# Patient Record
Sex: FEMALE | Race: WHITE | Employment: FULL TIME | URBAN - METROPOLITAN AREA
[De-identification: names, ages, dates, MRNs, and addresses within clinical notes are randomized per-mention and may not be internally consistent; named-entity substitution may affect disease eponyms.]

---

## 2021-08-18 ENCOUNTER — HOSPITAL ENCOUNTER (OUTPATIENT)
Age: 66
Setting detail: OUTPATIENT SURGERY
Discharge: HOME | End: 2021-08-18
Attending: OPHTHALMOLOGY | Admitting: OPHTHALMOLOGY
Payer: MEDICARE

## 2021-08-18 ENCOUNTER — ANESTHESIA (OUTPATIENT)
Dept: SURGERY | Age: 66
End: 2021-08-18
Payer: MEDICARE

## 2021-08-18 ENCOUNTER — ANESTHESIA EVENT (OUTPATIENT)
Dept: SURGERY | Age: 66
End: 2021-08-18
Payer: MEDICARE

## 2021-08-18 VITALS
HEIGHT: 70 IN | DIASTOLIC BLOOD PRESSURE: 65 MMHG | WEIGHT: 151.2 LBS | RESPIRATION RATE: 16 BRPM | HEART RATE: 51 BPM | BODY MASS INDEX: 21.64 KG/M2 | TEMPERATURE: 97.5 F | OXYGEN SATURATION: 100 % | SYSTOLIC BLOOD PRESSURE: 131 MMHG

## 2021-08-18 LAB — POTASSIUM BLD-SCNC: 3.3 MMOL/L (ref 3.5–5.1)

## 2021-08-18 PROCEDURE — 77030033576 HC PK VITRCMY TOT PLS ALCN -F: Performed by: OPHTHALMOLOGY

## 2021-08-18 PROCEDURE — 77030013428 HC CAUT LOW TEMP AARM -A: Performed by: OPHTHALMOLOGY

## 2021-08-18 PROCEDURE — 77030016693: Performed by: OPHTHALMOLOGY

## 2021-08-18 PROCEDURE — 77030018837 HC SOL IRR OPTH ALCN -A: Performed by: OPHTHALMOLOGY

## 2021-08-18 PROCEDURE — 76210000020 HC REC RM PH II FIRST 0.5 HR: Performed by: OPHTHALMOLOGY

## 2021-08-18 PROCEDURE — 74011000250 HC RX REV CODE- 250: Performed by: NURSE ANESTHETIST, CERTIFIED REGISTERED

## 2021-08-18 PROCEDURE — 76010000160 HC OR TIME 0.5 TO 1 HR INTENSV-TIER 1: Performed by: OPHTHALMOLOGY

## 2021-08-18 PROCEDURE — 74011000250 HC RX REV CODE- 250: Performed by: OPHTHALMOLOGY

## 2021-08-18 PROCEDURE — 2709999900 HC NON-CHARGEABLE SUPPLY: Performed by: OPHTHALMOLOGY

## 2021-08-18 PROCEDURE — 74011250637 HC RX REV CODE- 250/637: Performed by: ANESTHESIOLOGY

## 2021-08-18 PROCEDURE — 77030032655 HC PRB LSR FLX ILLUM ALCON -C: Performed by: OPHTHALMOLOGY

## 2021-08-18 PROCEDURE — 77030018838 HC SOL IRR OPTH ALCN -B: Performed by: OPHTHALMOLOGY

## 2021-08-18 PROCEDURE — 77030032653 HC PRB DSP DIATHRMY DISP ALCN -B: Performed by: OPHTHALMOLOGY

## 2021-08-18 PROCEDURE — 77030026085: Performed by: OPHTHALMOLOGY

## 2021-08-18 PROCEDURE — 74011250636 HC RX REV CODE- 250/636: Performed by: ANESTHESIOLOGY

## 2021-08-18 PROCEDURE — 76210000006 HC OR PH I REC 0.5 TO 1 HR: Performed by: OPHTHALMOLOGY

## 2021-08-18 PROCEDURE — 74011250636 HC RX REV CODE- 250/636: Performed by: NURSE ANESTHETIST, CERTIFIED REGISTERED

## 2021-08-18 PROCEDURE — 74011250636 HC RX REV CODE- 250/636: Performed by: OPHTHALMOLOGY

## 2021-08-18 PROCEDURE — 77030032652 HC NDL OPHTH BKLFSH ALCN -C: Performed by: OPHTHALMOLOGY

## 2021-08-18 PROCEDURE — 77030017621 HC NDL OPHTH1 ALCN -B: Performed by: OPHTHALMOLOGY

## 2021-08-18 PROCEDURE — 77030032623 HC KT VITRCMY TOT PLS ALCN -F: Performed by: OPHTHALMOLOGY

## 2021-08-18 PROCEDURE — 84132 ASSAY OF SERUM POTASSIUM: CPT

## 2021-08-18 PROCEDURE — 76060000032 HC ANESTHESIA 0.5 TO 1 HR: Performed by: OPHTHALMOLOGY

## 2021-08-18 PROCEDURE — 77030038275 HC DEV VIEW LENS DISP OCLS -B: Performed by: OPHTHALMOLOGY

## 2021-08-18 RX ORDER — MIDAZOLAM HYDROCHLORIDE 1 MG/ML
2 INJECTION, SOLUTION INTRAMUSCULAR; INTRAVENOUS ONCE
Status: DISCONTINUED | OUTPATIENT
Start: 2021-08-18 | End: 2021-08-18 | Stop reason: HOSPADM

## 2021-08-18 RX ORDER — LISINOPRIL AND HYDROCHLOROTHIAZIDE 20; 25 MG/1; MG/1
1 TABLET ORAL DAILY
COMMUNITY
Start: 2021-01-04

## 2021-08-18 RX ORDER — DEXAMETHASONE SODIUM PHOSPHATE 100 MG/10ML
INJECTION INTRAMUSCULAR; INTRAVENOUS AS NEEDED
Status: DISCONTINUED | OUTPATIENT
Start: 2021-08-18 | End: 2021-08-18 | Stop reason: HOSPADM

## 2021-08-18 RX ORDER — PHENYLEPHRINE HYDROCHLORIDE 25 MG/ML
1 SOLUTION/ DROPS OPHTHALMIC
Status: COMPLETED | OUTPATIENT
Start: 2021-08-18 | End: 2021-08-18

## 2021-08-18 RX ORDER — ACETAMINOPHEN 500 MG
1000 TABLET ORAL ONCE
Status: COMPLETED | OUTPATIENT
Start: 2021-08-18 | End: 2021-08-18

## 2021-08-18 RX ORDER — CEFAZOLIN SODIUM 1 G/3ML
INJECTION, POWDER, FOR SOLUTION INTRAMUSCULAR; INTRAVENOUS AS NEEDED
Status: DISCONTINUED | OUTPATIENT
Start: 2021-08-18 | End: 2021-08-18 | Stop reason: HOSPADM

## 2021-08-18 RX ORDER — MIDAZOLAM HYDROCHLORIDE 1 MG/ML
2 INJECTION, SOLUTION INTRAMUSCULAR; INTRAVENOUS
Status: DISCONTINUED | OUTPATIENT
Start: 2021-08-18 | End: 2021-08-18 | Stop reason: HOSPADM

## 2021-08-18 RX ORDER — ATROPINE SULFATE 10 MG/ML
1 SOLUTION/ DROPS OPHTHALMIC
Status: COMPLETED | OUTPATIENT
Start: 2021-08-18 | End: 2021-08-18

## 2021-08-18 RX ORDER — ISOTRETINOIN 40 MG/1
40 CAPSULE, GELATIN COATED ORAL 2 TIMES DAILY
COMMUNITY
Start: 2021-07-20

## 2021-08-18 RX ORDER — ALBUTEROL SULFATE 0.83 MG/ML
2.5 SOLUTION RESPIRATORY (INHALATION) AS NEEDED
Status: DISCONTINUED | OUTPATIENT
Start: 2021-08-18 | End: 2021-08-18 | Stop reason: HOSPADM

## 2021-08-18 RX ORDER — OXYCODONE HYDROCHLORIDE 5 MG/1
5 TABLET ORAL
Status: DISCONTINUED | OUTPATIENT
Start: 2021-08-18 | End: 2021-08-18 | Stop reason: HOSPADM

## 2021-08-18 RX ORDER — HYDROMORPHONE HYDROCHLORIDE 2 MG/ML
0.5 INJECTION, SOLUTION INTRAMUSCULAR; INTRAVENOUS; SUBCUTANEOUS
Status: DISCONTINUED | OUTPATIENT
Start: 2021-08-18 | End: 2021-08-18 | Stop reason: HOSPADM

## 2021-08-18 RX ORDER — LIDOCAINE HYDROCHLORIDE 20 MG/ML
INJECTION, SOLUTION EPIDURAL; INFILTRATION; INTRACAUDAL; PERINEURAL AS NEEDED
Status: DISCONTINUED | OUTPATIENT
Start: 2021-08-18 | End: 2021-08-18 | Stop reason: HOSPADM

## 2021-08-18 RX ORDER — SODIUM CHLORIDE, SODIUM LACTATE, POTASSIUM CHLORIDE, CALCIUM CHLORIDE 600; 310; 30; 20 MG/100ML; MG/100ML; MG/100ML; MG/100ML
50 INJECTION, SOLUTION INTRAVENOUS CONTINUOUS
Status: DISCONTINUED | OUTPATIENT
Start: 2021-08-18 | End: 2021-08-18 | Stop reason: HOSPADM

## 2021-08-18 RX ORDER — TROPICAMIDE 10 MG/ML
1 SOLUTION/ DROPS OPHTHALMIC
Status: COMPLETED | OUTPATIENT
Start: 2021-08-18 | End: 2021-08-18

## 2021-08-18 RX ORDER — METOPROLOL SUCCINATE 25 MG/1
TABLET, EXTENDED RELEASE ORAL
COMMUNITY
Start: 2021-01-04

## 2021-08-18 RX ORDER — SODIUM CHLORIDE, SODIUM LACTATE, POTASSIUM CHLORIDE, CALCIUM CHLORIDE 600; 310; 30; 20 MG/100ML; MG/100ML; MG/100ML; MG/100ML
75 INJECTION, SOLUTION INTRAVENOUS CONTINUOUS
Status: DISCONTINUED | OUTPATIENT
Start: 2021-08-18 | End: 2021-08-18 | Stop reason: HOSPADM

## 2021-08-18 RX ORDER — PROPOFOL 10 MG/ML
INJECTION, EMULSION INTRAVENOUS AS NEEDED
Status: DISCONTINUED | OUTPATIENT
Start: 2021-08-18 | End: 2021-08-18 | Stop reason: HOSPADM

## 2021-08-18 RX ORDER — LIDOCAINE HYDROCHLORIDE 10 MG/ML
0.1 INJECTION INFILTRATION; PERINEURAL AS NEEDED
Status: DISCONTINUED | OUTPATIENT
Start: 2021-08-18 | End: 2021-08-18 | Stop reason: HOSPADM

## 2021-08-18 RX ORDER — SERTRALINE HYDROCHLORIDE 100 MG/1
200 TABLET, FILM COATED ORAL DAILY
COMMUNITY
Start: 2021-01-04

## 2021-08-18 RX ORDER — ATORVASTATIN CALCIUM 20 MG/1
20 TABLET, FILM COATED ORAL DAILY
COMMUNITY
Start: 2021-01-04

## 2021-08-18 RX ORDER — FENTANYL CITRATE 50 UG/ML
100 INJECTION, SOLUTION INTRAMUSCULAR; INTRAVENOUS ONCE
Status: DISCONTINUED | OUTPATIENT
Start: 2021-08-18 | End: 2021-08-18 | Stop reason: HOSPADM

## 2021-08-18 RX ORDER — BUPIVACAINE HYDROCHLORIDE 7.5 MG/ML
INJECTION, SOLUTION EPIDURAL; RETROBULBAR AS NEEDED
Status: DISCONTINUED | OUTPATIENT
Start: 2021-08-18 | End: 2021-08-18 | Stop reason: HOSPADM

## 2021-08-18 RX ADMIN — PHENYLEPHRINE HYDROCHLORIDE 1 DROP: 25 SOLUTION/ DROPS OPHTHALMIC at 17:12

## 2021-08-18 RX ADMIN — TROPICAMIDE 1 DROP: 10 SOLUTION/ DROPS OPHTHALMIC at 17:12

## 2021-08-18 RX ADMIN — TROPICAMIDE 1 DROP: 10 SOLUTION/ DROPS OPHTHALMIC at 17:30

## 2021-08-18 RX ADMIN — SODIUM CHLORIDE, SODIUM LACTATE, POTASSIUM CHLORIDE, AND CALCIUM CHLORIDE 75 ML/HR: 600; 310; 30; 20 INJECTION, SOLUTION INTRAVENOUS at 17:26

## 2021-08-18 RX ADMIN — ATROPINE SULFATE 1 DROP: 10 SOLUTION/ DROPS OPHTHALMIC at 17:23

## 2021-08-18 RX ADMIN — ATROPINE SULFATE 1 DROP: 10 SOLUTION/ DROPS OPHTHALMIC at 17:12

## 2021-08-18 RX ADMIN — LIDOCAINE HYDROCHLORIDE 60 MG: 20 INJECTION, SOLUTION EPIDURAL; INFILTRATION; INTRACAUDAL; PERINEURAL at 18:10

## 2021-08-18 RX ADMIN — PHENYLEPHRINE HYDROCHLORIDE 1 DROP: 25 SOLUTION/ DROPS OPHTHALMIC at 17:23

## 2021-08-18 RX ADMIN — TROPICAMIDE 1 DROP: 10 SOLUTION/ DROPS OPHTHALMIC at 17:23

## 2021-08-18 RX ADMIN — PROPOFOL 50 MG: 10 INJECTION, EMULSION INTRAVENOUS at 18:11

## 2021-08-18 RX ADMIN — PHENYLEPHRINE HYDROCHLORIDE 1 DROP: 25 SOLUTION/ DROPS OPHTHALMIC at 17:30

## 2021-08-18 RX ADMIN — ACETAMINOPHEN 1000 MG: 500 TABLET, FILM COATED ORAL at 17:12

## 2021-08-18 NOTE — ANESTHESIA PREPROCEDURE EVALUATION
Relevant Problems   No relevant active problems       Anesthetic History   No history of anesthetic complications            Review of Systems / Medical History  Patient summary reviewed, nursing notes reviewed and pertinent labs reviewed    Pulmonary  Within defined limits                 Neuro/Psych   Within defined limits           Cardiovascular    Hypertension          Hyperlipidemia    Exercise tolerance: >4 METS     GI/Hepatic/Renal  Within defined limits              Endo/Other  Within defined limits           Other Findings              Physical Exam    Airway  Mallampati: II      Mouth opening: Normal     Cardiovascular  Regular rate and rhythm,  S1 and S2 normal,  no murmur, click, rub, or gallop             Dental  No notable dental hx       Pulmonary  Breath sounds clear to auscultation               Abdominal         Other Findings            Anesthetic Plan    ASA: 2  Anesthesia type: total IV anesthesia          Induction: Intravenous  Anesthetic plan and risks discussed with: Patient

## 2021-08-18 NOTE — BRIEF OP NOTE
Brief Postoperative Note    Patient: Fabrizio Guillaume  YOB: 1955  MRN: [id number]    Date of Procedure: 8/18/2021     Pre-Op Diagnosis: Left retinal detachment [H33.22]    Post-Op Diagnosis: Same as preoperative diagnosis.       Procedure(s):  LEFT VITRECTOMY, POSTERIOR 25 GAUGE LASER GAS FLUID EXCHANGE    Surgeon(s):  Monserrat Kinney MD    Surgical Assistant: None    Anesthesia: MAC     Estimated Blood Loss (mL): Minimal    Complications: None    Specimens: * No specimens in log *     Implants: * No implants in log *    Drains: * No LDAs found *    Findings: Nasal     Electronically Signed by Cristine Madera MD on 8/18/2021 at 7:03 PM

## 2021-08-18 NOTE — ANESTHESIA POSTPROCEDURE EVALUATION
Procedure(s):  LEFT VITRECTOMY, POSTERIOR 25 GAUGE LASER GAS FLUID EXCHANGE.    total IV anesthesia    Anesthesia Post Evaluation      Multimodal analgesia: multimodal analgesia used between 6 hours prior to anesthesia start to PACU discharge  Patient location during evaluation: PACU  Patient participation: complete - patient participated  Level of consciousness: awake and alert  Pain management: adequate  Airway patency: patent  Anesthetic complications: no  Cardiovascular status: acceptable  Respiratory status: acceptable, spontaneous ventilation and nonlabored ventilation  Hydration status: acceptable  Post anesthesia nausea and vomiting:  none      INITIAL Post-op Vital signs:   Vitals Value Taken Time   /65 08/18/21 1929   Temp 36.4 °C (97.5 °F) 08/18/21 1904   Pulse 50 08/18/21 1930   Resp 15 08/18/21 1924   SpO2 100 % 08/18/21 1930   Vitals shown include unvalidated device data.

## 2021-08-18 NOTE — DISCHARGE INSTRUCTIONS
[facility], [facility] MD Kashif Gonzales MD Tanya Ra. Reyes Silva, MD Unice Claw. Kem Richards MD    [phone] or [phone] or [phone] or [phone]    Post Operative Instructions for Retina and Vitreous Surgery Patients    The following are a few guidelines that you should observe for two weeks after surgery:    1. Avoid stooping over, lifting heavy weights or bumping your head. 2.  Special positioning:    3. No extensive traveling except what is necessary. If a gas air bubble was put in your      eye at surgery - avoid air travel until you consult your doctor. 4.  You may watch television, read, cook and wash dishes as long as it does not interfere        with special positioning instructions. 5.  No strenuous activity or sexual intercourse. 6.  Some discharge from the operated eye is to be expected. This should gradually        improve. 7.  Wear the eye shield or glasses at all times until cleared by the doctor. 8.  If you experience increasing pain, decreasing vision, or pus like discharge, call the      Office. 9. BRING ALL EYE DROPS TO YOU POSTOPERATIVE APPOINTMENT! 10. Return appointment at the [facility]        On _______________    Medication Interaction:  During your procedure you potentially received a medication or medications which may reduce the effectiveness of oral contraceptives. Please consider other forms of contraception for 1 month following your procedure if you are currently using oral contraceptives as your primary form of birth control. In addition to this, we recommend continuing your oral contraceptive as prescribed, unless otherwise instructed by your physician, during this time.     After general anesthesia or intravenous sedation, for 24 hours or while taking prescription Narcotics:  · Limit your activities  · A responsible adult needs to be with you for the next 24 hours  · Do not drive and operate hazardous machinery  · Do not make important personal or business decisions  · Do not drink alcoholic beverages  · If you have not urinated within 8 hours after discharge, and you are experiencing discomfort from urinary retention, please go to the nearest ED. · If you have sleep apnea and have a CPAP machine, please use it for all naps and sleeping. · Please use caution when taking narcotics and any of your home medications that may cause drowsiness. *  Please give a list of your current medications to your Primary Care Provider. *  Please update this list whenever your medications are discontinued, doses are      changed, or new medications (including over-the-counter products) are added. *  Please carry medication information at all times in case of emergency situations. These are general instructions for a healthy lifestyle:  No smoking/ No tobacco products/ Avoid exposure to second hand smoke  Surgeon General's Warning:  Quitting smoking now greatly reduces serious risk to your health. Obesity, smoking, and sedentary lifestyle greatly increases your risk for illness  A healthy diet, regular physical exercise & weight monitoring are important for maintaining a healthy lifestyle    You may be retaining fluid if you have a history of heart failure or if you experience any of the following symptoms:  Weight gain of 3 pounds or more overnight or 5 pounds in a week, increased swelling in our hands or feet or shortness of breath while lying flat in bed. Please call your doctor as soon as you notice any of these symptoms; do not wait until your next office visit.

## 2021-08-19 NOTE — OP NOTES
[facility]  OPERATIVE REPORT    Name:  Percy Kay  MR#:  [id number]  :  1955  ACCOUNT #:  [de-identified]  DATE OF SERVICE:  2021    PREOPERATIVE DIAGNOSES:  Rhegmatogenous retinal detachment, left eye, macula uninvolved, detachment extending from 7 o'clock to 2 o'clock, retinal tears nasally at 8 to 9, lattice at 6 o'clock with small fishmouth break 6 as well. POSTOPERATIVE DIAGNOSES:  Same. PROCEDURES PERFORMED:  Repair of retinal detachment, pars plana vitrectomy, fluid-gas exchange SF6 20% and laser photocoagulation 658 spots, left eye. SURGEON:  Mick Velazquez. Gilberto Bliss MD    ASSISTANT:  None. ANESTHESIA:  IV general with retrobulbar and Whit Comber blocks. COMPLICATIONS:  None. SPECIMENS REMOVED:  None. IMPLANTS:  None. ESTIMATED BLOOD LOSS:  Less than 1 mL. FINDINGS:  As above. PROCEDURE:  The patient was identified and after obtaining informed consent, the patient's left pupil was dilated. The patient, surgeon, and operating room staff verified the operative site. The patient was taken to the operative suite where after preoperative time-out was performed, retrobulbar and Whit Comber blocks were placed about the left eye under IV sedation. The patient was prepped and draped in the usual sterile fashion for ophthalmic surgery in the left eye using a Betadine prep of the conjunctival cul-de-sac and periocular skin and plastic draping was placed over the lids and lashes. Eyelid speculum was placed and 25-gauge valved trocars were then used to make sclerotomy at the 10, 2 and 4 o'clock 3 mm posterior to the limbus. The infusion cannula was run through to eliminate any gas bubbles. This was then placed in the four-quadrant sclerotomy trocar and was then identified within the vitreous cavity. After noting that it was free of any obstruction, the infusion line was opened. The BIOM was brought over the patient in fine focus.   The Ocutome handpiece and Light Pipe were placed in the eye. The findings were noted as above. Core vitrectomy was performed, clearing the central vitreous cavity. The vitreous was trimmed down anterior to the equator for 360 degrees giving special attention to the areas of vitreous from underlying repairs at 8 and 9 and also the area of lattice at 6 o'clock. It was noted that this area also had a small fishmouth tear at 6 o'clock as well. After trimming anterior vitreous for 360 degrees, the internal diathermy was placed in the eye. The retinal tears were marked. The backflush needle was placed into the eye. Fluid-air exchange was performed flattening the retina. Subretinal fluid was drained through the 8 o'clock tear. Retina was reattached to the retinal pigment epithelium and the curved flexible laser probe was placed in the eye and laser photocoagulation was placed around the retinal tears at 8 and 9 o'clock as well as the lattice degeneration at 6 and anterior to the equator in the areas of the bed of the retinal detachment. The SF6 gas was then prepared through sterile Millipore filters to a concentration of 20%. This was attached to the infusion line via a three-way stopcock. A backflush needle was then used to dry the retinal surface again and the superior trocars were then removed from the eye and SF6 gas mixture was then irrigated through the eye venting the eye for 30-gauge needle attached to tuberculin syringe placed in the eye through the pars plana at 10 o'clock. Approximately, 35 mL of the SF6 gas mixture was irrigated through the eye and intraocular pressure was then normalized. The infusion line was clamped and removed from the eye with the trocar at 4 o'clock being removed from the eye and all the wounds being airtight. The eye maintained normal intraocular pressure for greater than one minute with pressure by palpation of approximately 25 mmHg.   100 mg Ancef and 5 mg of Decadron were then given subconjunctivally and the eyelid speculum was removed from the eye. The patient was undraped. Two eye pads and eye shield were placed over the eye. The patient was then transported to the recovery area in stable condition.       Neeru Guerrero MD      [id number]  D:  08/18/2021 20:03  T:  08/19/2021 4:54  JOB #:  [id number]

## 2023-05-14 RX ORDER — METOPROLOL SUCCINATE 25 MG/1
TABLET, EXTENDED RELEASE ORAL
COMMUNITY
Start: 2021-01-04

## 2023-05-14 RX ORDER — LISINOPRIL AND HYDROCHLOROTHIAZIDE 25; 20 MG/1; MG/1
1 TABLET ORAL DAILY
COMMUNITY
Start: 2021-01-04

## 2023-05-14 RX ORDER — SERTRALINE HYDROCHLORIDE 100 MG/1
200 TABLET, FILM COATED ORAL DAILY
COMMUNITY
Start: 2021-01-04

## 2023-05-14 RX ORDER — ATORVASTATIN CALCIUM 20 MG/1
20 TABLET, FILM COATED ORAL DAILY
COMMUNITY
Start: 2021-01-04

## 2023-05-14 RX ORDER — ISOTRETINOIN 40 MG/1
40 CAPSULE ORAL 2 TIMES DAILY
COMMUNITY
Start: 2021-07-20

## (undated) DEVICE — LENS VITRCTMY OCU FLAT DISP

## (undated) DEVICE — CONSTELLATION VISION SYSTEM 5000 CPM ULTRAVIT PROBE STRAIGHT ENDOILLUMINATOR 25+ TOTALPLUS VITRECTOMY PAK EDGEPLUS BLADE VALVED ENTRY SYSTEM: Brand: CONSTELLATION, ULTRAVIT, 25+, TOTALPLUS, EDGEPLUS

## (undated) DEVICE — 25 GA ILLUMINATED FLEXIBLE CURVED LASER PROBE: Brand: ALCON, ENGAUGE

## (undated) DEVICE — SOLUTION IRRIGATION BAL SALT SOLUTION 500 ML BTL 6/CA BSS +

## (undated) DEVICE — CONSTELLATION VISION SYSTEM 7500 CPM ULTRAVIT PROBE STRAIGHT ENDOILLUMINATOR 25+ TOTALPLUS VITRECTOMY PAK EDGEPLUS BLADE VALVED ENTRY SYSTEM: Brand: CONSTELLATION, ULTRAVIT, 25+, TOTALPLUS, EDGEPLUS

## (undated) DEVICE — ISPAN SF6 CONSTELLATION 125GM: Brand: ISPAN

## (undated) DEVICE — MICROSURGICAL INSTRUMENT 25GA SOFT TIP NEEDLE: Brand: ALCON

## (undated) DEVICE — STANDARD HYPODERMIC NEEDLE,ALUMINUM HUB: Brand: MONOJECT

## (undated) DEVICE — DISPOSABLE BIPOLAR CODE, 12' (3.66 M): Brand: CONMED

## (undated) DEVICE — DRAPE TOWEL: Brand: CONVERTORS

## (undated) DEVICE — 25 GAUGE ENTRY SYSTEM, ENHANCED, 1 EA: Brand: ALCON

## (undated) DEVICE — CAUTERY ES 1100DEG LO TEMP ELONG FN TIP

## (undated) DEVICE — SOLUTION IRRIGATION BAL SALT SOLUTION 500 ML STRL BSS

## (undated) DEVICE — SURGICAL PROCEDURE PACK EYE CDS

## (undated) DEVICE — DIATHERMY PROBE DSP, 25G: Brand: ALCON GRIESHABER

## (undated) DEVICE — PAD,EYE,1-5/8X2 5/8,STERILE,LF,1/PK: Brand: MEDLINE

## (undated) DEVICE — Device: Brand: MILLEX-OR

## (undated) DEVICE — ADVANCED DSP BACKFLUSH BLUNT, 25G: Brand: ALCON GRIESHABER

## (undated) DEVICE — APPLICATOR COT TIP 3IN WOOD --

## (undated) DEVICE — ADVANCED DSP BACKFLUSH SOFT TIP, 25G: Brand: ALCON GRIESHABER